# Patient Record
Sex: MALE | Race: BLACK OR AFRICAN AMERICAN | NOT HISPANIC OR LATINO | Employment: UNEMPLOYED | URBAN - METROPOLITAN AREA
[De-identification: names, ages, dates, MRNs, and addresses within clinical notes are randomized per-mention and may not be internally consistent; named-entity substitution may affect disease eponyms.]

---

## 2024-04-13 ENCOUNTER — HOSPITAL ENCOUNTER (EMERGENCY)
Facility: CLINIC | Age: 29
Discharge: HOME | End: 2024-04-13
Attending: EMERGENCY MEDICINE | Admitting: EMERGENCY MEDICINE
Payer: COMMERCIAL

## 2024-04-13 VITALS
DIASTOLIC BLOOD PRESSURE: 88 MMHG | RESPIRATION RATE: 16 BRPM | TEMPERATURE: 98.4 F | SYSTOLIC BLOOD PRESSURE: 147 MMHG | BODY MASS INDEX: 27.74 KG/M2 | OXYGEN SATURATION: 97 % | HEART RATE: 103 BPM | HEIGHT: 68 IN | WEIGHT: 183 LBS

## 2024-04-13 DIAGNOSIS — J02.0 STREP THROAT: ICD-10-CM

## 2024-04-13 LAB
FLUAV RNA SPEC QL NAA+PROBE: NEGATIVE
FLUBV RNA RESP QL NAA+PROBE: NEGATIVE
GROUP A STREP BY PCR: DETECTED
RSV RNA SPEC NAA+PROBE: NEGATIVE
SARS-COV-2 RNA RESP QL NAA+PROBE: NEGATIVE

## 2024-04-13 PROCEDURE — 250N000013 HC RX MED GY IP 250 OP 250 PS 637: Performed by: EMERGENCY MEDICINE

## 2024-04-13 PROCEDURE — 87651 STREP A DNA AMP PROBE: CPT | Performed by: EMERGENCY MEDICINE

## 2024-04-13 PROCEDURE — 250N000009 HC RX 250: Performed by: EMERGENCY MEDICINE

## 2024-04-13 PROCEDURE — 87637 SARSCOV2&INF A&B&RSV AMP PRB: CPT | Performed by: EMERGENCY MEDICINE

## 2024-04-13 PROCEDURE — 99283 EMERGENCY DEPT VISIT LOW MDM: CPT

## 2024-04-13 RX ORDER — AMOXICILLIN 500 MG/1
500 CAPSULE ORAL 2 TIMES DAILY
Qty: 20 CAPSULE | Refills: 0 | Status: SHIPPED | OUTPATIENT
Start: 2024-04-13 | End: 2024-04-23

## 2024-04-13 RX ORDER — MAGNESIUM HYDROXIDE/ALUMINUM HYDROXICE/SIMETHICONE 120; 1200; 1200 MG/30ML; MG/30ML; MG/30ML
15 SUSPENSION ORAL ONCE
Status: COMPLETED | OUTPATIENT
Start: 2024-04-13 | End: 2024-04-13

## 2024-04-13 RX ORDER — LIDOCAINE HYDROCHLORIDE 20 MG/ML
15 SOLUTION OROPHARYNGEAL ONCE
Status: COMPLETED | OUTPATIENT
Start: 2024-04-13 | End: 2024-04-13

## 2024-04-13 RX ORDER — AMOXICILLIN 875 MG
875 TABLET ORAL ONCE
Status: COMPLETED | OUTPATIENT
Start: 2024-04-13 | End: 2024-04-13

## 2024-04-13 RX ADMIN — AMOXICILLIN 875 MG: 875 TABLET, COATED ORAL at 20:12

## 2024-04-13 RX ADMIN — LIDOCAINE HYDROCHLORIDE 15 ML: 20 SOLUTION ORAL at 19:27

## 2024-04-13 RX ADMIN — ALUMINUM HYDROXIDE, MAGNESIUM HYDROXIDE, AND DIMETHICONE 15 ML: 200; 20; 200 SUSPENSION ORAL at 19:26

## 2024-04-13 ASSESSMENT — ACTIVITIES OF DAILY LIVING (ADL)
ADLS_ACUITY_SCORE: 33
ADLS_ACUITY_SCORE: 33

## 2024-04-13 ASSESSMENT — COLUMBIA-SUICIDE SEVERITY RATING SCALE - C-SSRS
1. IN THE PAST MONTH, HAVE YOU WISHED YOU WERE DEAD OR WISHED YOU COULD GO TO SLEEP AND NOT WAKE UP?: NO
2. HAVE YOU ACTUALLY HAD ANY THOUGHTS OF KILLING YOURSELF IN THE PAST MONTH?: NO
6. HAVE YOU EVER DONE ANYTHING, STARTED TO DO ANYTHING, OR PREPARED TO DO ANYTHING TO END YOUR LIFE?: NO

## 2024-04-13 NOTE — ED TRIAGE NOTES
Pt presents with 2 days of sore throat, headaches, fever. Denies body aches, n/v/d. Last took ibuprofen and tylenol around 10:00 today. Pt declines ibuprofen and tylenol in triage.

## 2024-04-14 NOTE — ED PROVIDER NOTES
"  History     Chief Complaint:  Cold Symptoms       HPI     Kirill English is a 28 year old male who presents with cold symptoms. Patient started developing symptoms about 2 days ago. This included headache, generalized body pains, and soreness in his throat. Patient is able to swallow, but notes that it hurts to do so. He additionally endorses subjective fever yesterday that presented with sweating. He denies cough, runny nose, contact with sick individuals, or recent travel.     Independent Historian:   None - Patient Only    Review of External Notes:   None    Medications:    No current outpatient medications on file.    Past Medical History:    No past medical history on file.    Past Surgical History:    No past surgical history on file.     Physical Exam   Patient Vitals for the past 24 hrs:   BP Temp Temp src Pulse Resp SpO2 Height Weight   04/13/24 1850 (!) 147/88 98.4  F (36.9  C) Temporal 103 16 97 % 1.727 m (5' 8\") 83 kg (183 lb)        Physical Exam    GEN:    Pleasant, age appropriate.     Resting comfortably in the bed.  HEENT:    Tympanic membranes are clear bilateral.      Oropharynx is moist.       Bilateral tonsillar erythema without exudate or asymmetric edema.     No deviation of the uvula.     No pooling of secretions, trismus or sublingual edema.  Eyes:    Conjunctiva normal, PERRL  Neck:     Supple, no meningismus.    No pain with manipulation of the hyoid.   CV:     Regular rate and rhythm  No murmurs, rubs or gallops.    PULM:    Clear to auscultation bilateral.       No respiratory distress.       No stridor.  ABD:    Soft, non-tender, non-distended.      No rebound or guarding.     No splenomegaly.  MSK:     No gross deformity to all four extremities.   LYMPH:   No cervical lymphadenopathy.  NEURO:   Alert.  Normal muscular tone, no atrophy.  Skin:    Warm, dry and intact.    PSYCH:    Mood is good and affect is appropriate.      Emergency Department Course     Laboratory:  Labs Ordered " and Resulted from Time of ED Arrival to Time of ED Departure   GROUP A STREPTOCOCCUS PCR THROAT SWAB - Abnormal       Result Value    Group A strep by PCR Detected (*)    INFLUENZA A/B, RSV, & SARS-COV2 PCR - Normal    Influenza A PCR Negative      Influenza B PCR Negative      RSV PCR Negative      SARS CoV2 PCR Negative          Procedures   None    Emergency Department Course & Assessments:    Interventions:  Medications   alum & mag hydroxide-simethicone (MAALOX) suspension 15 mL (15 mLs Oral $Given 4/13/24 1926)   lidocaine (viscous) (XYLOCAINE) 2 % solution 15 mL (15 mLs Mouth/Throat $Given 4/13/24 1927)   amoxicillin (AMOXIL) tablet 875 mg (875 mg Oral $Given 4/13/24 2012)        Independent Interpretation (X-rays, CTs, rhythm strip):  None    Assessments/Consultations/Discussion of Management or Tests:  ED Course as of 04/13/24 2042   Sat Apr 13, 2024 1922 I obtained history and examined the patient as noted above.     2006 I re-evaluated patient. I explained findings to the patient and we discussed plan for discharge. The patient is comfortable with this plan.         Social Determinants of Health affecting care:   None    Disposition:  The patient was discharged.     Impression & Plan    CMS Diagnoses: None       Medical Decision Making:    Patient was seen in the ED today for sore throat.  On examination there are no signs suggestive of periapical abscess, peritonsillar abscess, retropharyngeal abscess,  Ronan's angina or Lemierre's syndrome.  Rapid strep test in the ED is Positive signaling strep pharyngitis. Thus antibiotics  were  initiated with Amoxicillin. Patient is encouraged to use acetaminophen and ibuprofen for analgesia and instructed to return for fever, worsening pain, inability to swallow, neck pain or any other concerns.      Diagnosis:    ICD-10-CM    1. Strep throat  J02.0            Discharge Medications:  New Prescriptions    AMOXICILLIN (AMOXIL) 500 MG CAPSULE    Take 1 capsule (500  mg) by mouth 2 times daily for 10 days    MAGIC MOUTHWASH SUSPENSION (DIPHENHYDRAMINE, LIDOCAINE, ALUMINUM-MAGNESIUM & SIMETHICONE)    Swish and swallow 10 mLs in mouth every 6 hours as needed for sore throat      Scribe Disclosure:  I, Rita Indu, am serving as a scribe at 7:17 PM on 4/13/2024 to document services personally performed by César Coronado MD, based on my observations and the provider's statements to me.     4/13/2024   César Coronado MD Matthews, Jeremiah R, MD  04/13/24 2042